# Patient Record
Sex: MALE | Race: BLACK OR AFRICAN AMERICAN | NOT HISPANIC OR LATINO | Employment: FULL TIME | URBAN - METROPOLITAN AREA
[De-identification: names, ages, dates, MRNs, and addresses within clinical notes are randomized per-mention and may not be internally consistent; named-entity substitution may affect disease eponyms.]

---

## 2023-09-16 ENCOUNTER — HOSPITAL ENCOUNTER (EMERGENCY)
Facility: HOSPITAL | Age: 28
Discharge: HOME | End: 2023-09-16
Attending: EMERGENCY MEDICINE | Admitting: EMERGENCY MEDICINE
Payer: COMMERCIAL

## 2023-09-16 ENCOUNTER — APPOINTMENT (OUTPATIENT)
Dept: CT IMAGING | Facility: HOSPITAL | Age: 28
End: 2023-09-16
Payer: OTHER MISCELLANEOUS

## 2023-09-16 VITALS
SYSTOLIC BLOOD PRESSURE: 158 MMHG | WEIGHT: 315 LBS | HEART RATE: 67 BPM | DIASTOLIC BLOOD PRESSURE: 99 MMHG | HEIGHT: 74 IN | BODY MASS INDEX: 40.43 KG/M2 | OXYGEN SATURATION: 100 % | TEMPERATURE: 97.9 F | RESPIRATION RATE: 16 BRPM

## 2023-09-16 DIAGNOSIS — S06.0X0A CONCUSSION WITHOUT LOSS OF CONSCIOUSNESS, INITIAL ENCOUNTER: Primary | ICD-10-CM

## 2023-09-16 PROCEDURE — 70450 CT HEAD/BRAIN W/O DYE: CPT

## 2023-09-16 PROCEDURE — 99284 EMERGENCY DEPT VISIT MOD MDM: CPT

## 2023-09-16 RX ORDER — METOCLOPRAMIDE 5 MG/1
10 TABLET ORAL 3 TIMES DAILY PRN
Qty: 25 TABLET | Refills: 0 | Status: SHIPPED | OUTPATIENT
Start: 2023-09-16
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
## 2023-09-16 NOTE — Clinical Note
[facility] FSED [facility]  [address]  [place] [address]    Royce Medley was seen and treated in our emergency department on 9/16/2023.  He may return to work on 09/18/2023.         Thank you for choosing [facility].    Charleen Reid MD

## 2023-09-16 NOTE — Clinical Note
[facility] FSED [facility]  [address]  [place] [address]    Royce Medley was seen and treated in our emergency department on 9/16/2023.  He may return to work on 09/18/2023.         Thank you for choosing [facility].    Charleen Reid MD

## 2023-09-16 NOTE — DISCHARGE INSTRUCTIONS
The CT head did not show anything concerning. You have a mild concussion which usually does not show up on CT. Take the Reglan for nausea, which you can take one tablet when a migraine starts as well and add Ibuprofen 600 mg as well to treat migraines. Follow up with your PCP or call the number on this document for them to help you find a PCP. Please return to the ER if you have any symptoms that concern you.  
Abdomen soft, non-tender, no guarding.
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
## 2024-03-22 ENCOUNTER — HOSPITAL ENCOUNTER (EMERGENCY)
Facility: HOSPITAL | Age: 29
Discharge: HOME | End: 2024-03-22
Attending: EMERGENCY MEDICINE
Payer: OTHER MISCELLANEOUS

## 2024-03-22 ENCOUNTER — APPOINTMENT (OUTPATIENT)
Dept: GENERAL RADIOLOGY | Facility: HOSPITAL | Age: 29
End: 2024-03-22
Payer: OTHER MISCELLANEOUS

## 2024-03-22 VITALS
TEMPERATURE: 97.7 F | WEIGHT: 315 LBS | SYSTOLIC BLOOD PRESSURE: 140 MMHG | HEART RATE: 100 BPM | BODY MASS INDEX: 40.43 KG/M2 | OXYGEN SATURATION: 99 % | DIASTOLIC BLOOD PRESSURE: 85 MMHG | RESPIRATION RATE: 18 BRPM | HEIGHT: 74 IN

## 2024-03-22 DIAGNOSIS — S63.657A SPRAIN OF METACARPOPHALANGEAL (MCP) JOINT OF LEFT LITTLE FINGER, INITIAL ENCOUNTER: Primary | ICD-10-CM

## 2024-03-22 PROCEDURE — 73130 X-RAY EXAM OF HAND: CPT

## 2024-03-22 PROCEDURE — 99283 EMERGENCY DEPT VISIT LOW MDM: CPT

## 2024-03-22 RX ORDER — NAPROXEN 500 MG/1
500 TABLET ORAL 2 TIMES DAILY PRN
Qty: 20 TABLET | Refills: 0 | Status: SHIPPED | OUTPATIENT
Start: 2024-03-22

## 2024-03-22 NOTE — Clinical Note
[facility] EMERGENCY DEPARTMENT  [address] [place] [address]  Phone: [phone]    Royce Medley was seen and treated in our emergency department on 3/22/2024.  He may return to work on 03/23/2024.         Thank you for choosing [facility].    Franck Carpenter MD
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
## 2024-04-26 PROCEDURE — 96375 TX/PRO/DX INJ NEW DRUG ADDON: CPT

## 2024-04-26 PROCEDURE — 96374 THER/PROPH/DIAG INJ IV PUSH: CPT

## 2024-04-26 PROCEDURE — 99284 EMERGENCY DEPT VISIT MOD MDM: CPT

## 2024-04-27 ENCOUNTER — HOSPITAL ENCOUNTER (EMERGENCY)
Facility: HOSPITAL | Age: 29
Discharge: HOME | End: 2024-04-27
Attending: EMERGENCY MEDICINE
Payer: COMMERCIAL

## 2024-04-27 ENCOUNTER — APPOINTMENT (OUTPATIENT)
Dept: CT IMAGING | Facility: HOSPITAL | Age: 29
End: 2024-04-27
Payer: COMMERCIAL

## 2024-04-27 VITALS
OXYGEN SATURATION: 95 % | SYSTOLIC BLOOD PRESSURE: 125 MMHG | BODY MASS INDEX: 40.43 KG/M2 | DIASTOLIC BLOOD PRESSURE: 93 MMHG | WEIGHT: 315 LBS | RESPIRATION RATE: 16 BRPM | TEMPERATURE: 96.9 F | HEIGHT: 74 IN | HEART RATE: 63 BPM

## 2024-04-27 DIAGNOSIS — S09.90XA CLOSED HEAD INJURY, INITIAL ENCOUNTER: Primary | ICD-10-CM

## 2024-04-27 PROCEDURE — 96374 THER/PROPH/DIAG INJ IV PUSH: CPT

## 2024-04-27 PROCEDURE — 25810000003 SODIUM CHLORIDE 0.9 % SOLUTION: Performed by: NURSE PRACTITIONER

## 2024-04-27 PROCEDURE — 25010000002 PROCHLORPERAZINE 10 MG/2ML SOLUTION: Performed by: NURSE PRACTITIONER

## 2024-04-27 PROCEDURE — 70450 CT HEAD/BRAIN W/O DYE: CPT

## 2024-04-27 PROCEDURE — 25010000002 DIPHENHYDRAMINE PER 50 MG: Performed by: NURSE PRACTITIONER

## 2024-04-27 PROCEDURE — 96375 TX/PRO/DX INJ NEW DRUG ADDON: CPT

## 2024-04-27 RX ORDER — ONDANSETRON 4 MG/1
4 TABLET, ORALLY DISINTEGRATING ORAL EVERY 8 HOURS PRN
Qty: 20 TABLET | Refills: 0 | Status: SHIPPED | OUTPATIENT
Start: 2024-04-27

## 2024-04-27 RX ORDER — DIPHENHYDRAMINE HYDROCHLORIDE 50 MG/ML
25 INJECTION INTRAMUSCULAR; INTRAVENOUS ONCE
Status: COMPLETED | OUTPATIENT
Start: 2024-04-27 | End: 2024-04-27

## 2024-04-27 RX ORDER — PROCHLORPERAZINE EDISYLATE 5 MG/ML
10 INJECTION INTRAMUSCULAR; INTRAVENOUS ONCE
Status: COMPLETED | OUTPATIENT
Start: 2024-04-27 | End: 2024-04-27

## 2024-04-27 RX ADMIN — SODIUM CHLORIDE 1000 ML: 9 INJECTION, SOLUTION INTRAVENOUS at 00:30

## 2024-04-27 RX ADMIN — DIPHENHYDRAMINE HYDROCHLORIDE 25 MG: 50 INJECTION, SOLUTION INTRAMUSCULAR; INTRAVENOUS at 00:31

## 2024-04-27 RX ADMIN — PROCHLORPERAZINE EDISYLATE 10 MG: 5 INJECTION INTRAMUSCULAR; INTRAVENOUS at 00:32

## 2024-04-27 NOTE — ED PROVIDER NOTES
"SHARED VISIT: This visit was performed by BOTH a physician and an APC. The substantive portion of the medical decision making was performed by this attesting physician who made or approved the management plan and takes responsibility for patient management. All studies in the APC note (if performed) were independently interpreted by me.     The EDDY and I have discussed this patient's history, physical exam, and treatment plan.  I have reviewed the documentation and personally had a face to face interaction with the patient. I affirm the documentation and agree with the treatment and plan.  The attached note describes my personal findings.      I provided a substantive portion of the care of the patient.  I personally performed the physical exam in its entirety, and below are my findings.     Brief history of present illness: 28-year-old male presents for headache with nausea and vomiting status post fall with head injury.  Patient reports mechanical fall as source of injury.    Physical exam:    /93   Pulse 74   Temp 96.9 °F (36.1 °C) (Tympanic)   Resp 16   Ht 188 cm (74\")   Wt (!) 150 kg (330 lb)   SpO2 96%   BMI 42.37 kg/m²       Physical Exam   Constitutional: No distress.  Nontoxic  HENT:  Head: Normocephalic and atraumatic.   Oropharynx: Mucous membranes are moist.   Eyes: .  Photophobia noted  Neck: Normal range of motion. Neck supple.   Cardiovascular: Pink warm and well perfused throughout.    Pulmonary/Chest: No respiratory distress.    Abdominal: Soft. There is no rebound or rigidity.  Benign  Musculoskeletal: Moves all extremities equally.  No deformity  Neurological: Alert and oriented.  Speech fluent and easily intelligible  Skin: Skin is pink, warm, and dry.   Psychiatric: Mood and affect normal.   Nursing note and vitals reviewed.        MDM:    My differential diagnosis includes but is not limited to cerebral contusion, cervical strain, concussion with LOC, concussion without LOC, " contusion, fracture of the skull, orbits or mandible, hematoma, intracranial hemorrhage including subdural, epidural, subarachnoid and intracerebral, laceration and postconcussion syndrome    RADIOLOGY      Study: Noncontrast CT head  Findings: No large volume intracranial hemorrhage or midline shift appreciated  I independently viewed and interpreted these images contemporaneously with treatment.       Please note that portions of this were completed with a voice recognition program.       Note Disclaimer: At [facility], we believe that sharing information builds trust and better relationships. You are receiving this note because you are receiving care at [facility] or recently visited. It is possible you will see health information before a provider has talked with you about it. This kind of information can be easy to misunderstand. To help you fully understand what it means for your health, we urge you to discuss this note with your provider.     Aly Ray MD  04/27/24 0133

## 2024-04-27 NOTE — DISCHARGE INSTRUCTIONS
You have been given emergency department evaluation.  This evaluation is intended to rule out life-threatening conditions.  Is not a complete evaluation.  You could require further testing as determined by your primary care physician or any referred specialist.  Please follow-up with all doctors that you are referred to.  Please be sure to take your prescribed medications and follow any specific instructions in the discharge instructions.  Please follow-up with your primary care physician within 48 hours.  Please have your primary care provider recheck your blood pressure.  Rest.  Drink plenty of fluids, stay hydrated.  Please follow-up with a neuropsychologist for further evaluation and management of symptoms.  Please return to the emergency department if you experience chest pain, shortness of breath, abdominal pain, fever greater than 102, intractable vomiting.  Please return to the emergency department if your symptoms continue or worsen, or if you begin to experience any other concerning symptom.

## 2024-04-27 NOTE — ED PROVIDER NOTES
EMERGENCY DEPARTMENT ENCOUNTER  Room Number:  05/05  PCP: Placido Hearn MD  Independent Historians: Patient  Time seen by me: 0014      HPI:  Chief Complaint: had concerns including Fall and Head Injury.     A complete HPI/ROS/PMH/PSH/SH/FH are unobtainable due to: None    Chronic or social conditions impacting patient care (Social Determinants of Health): None      Context: Royce Medley III is a 28 y.o. male who presents to the emergency department with complaints of head injury.  Patient states prior to arrival he tripped on something on his back porch causing him to fall backwards.  He states he hit his head during the fall.  He is not currently taking any anticoagulants.  No LOC.  Patient admits to nausea and photophobia.  Patient denies other injuries, other arthralgias, fever, chills, lightheadedness, dizziness, numbness, tingling, unilateral extremity weakness, syncope, shortness of breath, chest pain, abdominal pain, nausea, vomiting, diarrhea, dysuria, hematuria, or other complaints.    Review of prior external notes (non-ED) -and- Review of prior external test results outside of this encounter:   January 31, 2024: Urgent care office visit.  Patient seen for complaints of congestion.  Diagnosed with viral pharyngitis.    PAST MEDICAL HISTORY  Active Ambulatory Problems     Diagnosis Date Noted    No Active Ambulatory Problems     Resolved Ambulatory Problems     Diagnosis Date Noted    No Resolved Ambulatory Problems     Past Medical History:   Diagnosis Date    ADHD     Depression     Migraine          PAST SURGICAL HISTORY  Past Surgical History:   Procedure Laterality Date    KNEE ARTHROSCOPY W/ OSTEOCHONDRITIS DISSECANS LESION DRILLING Right     SHOULDER ARTHROSCOPY W/ LABRAL REPAIR Left          FAMILY HISTORY  No family history on file.      SOCIAL HISTORY  Social History     Socioeconomic History    Marital status: Single   Tobacco Use    Smoking status: Never   Substance and Sexual  Activity    Alcohol use: Never    Drug use: Never         ALLERGIES  Patient has no known allergies.      REVIEW OF SYSTEMS  Included in HPI  All systems reviewed and negative except for those discussed in HPI.      PHYSICAL EXAM    I have reviewed the triage vital signs and nursing notes.    ED Triage Vitals   Temp Heart Rate Resp BP SpO2   04/26/24 2341 04/26/24 2341 04/26/24 2341 04/26/24 2345 04/26/24 2341   96.9 °F (36.1 °C) 96 14 139/95 100 %      Temp src Heart Rate Source Patient Position BP Location FiO2 (%)   04/26/24 2341 04/26/24 2341 -- -- --   Tympanic Monitor          GENERAL: not distressed  HENT: nares patent  Head/neck/ face are symmetric without gross deformity or swelling  Photophobic, eyes are covered  EYES: no scleral icterus  CV: regular rhythm, regular rate with intact distal pulses  RESPIRATORY: normal effort and no respiratory distress  ABDOMEN: soft and non-tender  MUSCULOSKELETAL: no deformity  NEURO: alert and appropriate, moves all extremities, follows commands, speech is clear and fluent, no facial droop  SKIN: warm, dry    Vital signs and nursing notes reviewed.      LAB RESULTS  No results found for this or any previous visit (from the past 24 hour(s)).      RADIOLOGY  CT Head Without Contrast    Result Date: 4/27/2024  CT OF THE HEAD WITHOUT CONTRAST  HISTORY: Fall  COMPARISON: September 16, 2023  TECHNIQUE: Axial CT imaging was obtained through the brain. No IV contrast was administered.  FINDINGS: No acute intracranial hemorrhage is seen. Ventricles are normal in size. There is no midline shift or mass effect. No calvarial fracture is seen. Mucosal thickening is noted within the ethmoid sinuses, as well as the right maxillary sinus. Mucous retention cyst is the left sphenoid sinus. There are trace bilateral mastoid effusions.      No acute intracranial findings.  Radiation dose reduction techniques were utilized, including automated exposure control and exposure modulation based  on body size.   This report was finalized on 4/27/2024 1:28 AM by Dr. Erica Salazar M.D on Workstation: [id number]         MEDICATIONS GIVEN IN ER  Medications   sodium chloride 0.9 % bolus 1,000 mL (0 mL Intravenous Stopped 4/27/24 0056)   prochlorperazine (COMPAZINE) injection 10 mg (10 mg Intravenous Given 4/27/24 0032)   diphenhydrAMINE (BENADRYL) injection 25 mg (25 mg Intravenous Given 4/27/24 0031)           OUTPATIENT MEDICATION MANAGEMENT:  No current Epic-ordered facility-administered medications on file.     Current Outpatient Medications Ordered in Epic   Medication Sig Dispense Refill    metoclopramide (REGLAN) 5 MG tablet Take 2 tablets by mouth 3 (Three) Times a Day As Needed (Take one tablet when you have an onset of migraine symptoms along with Ibuprofen 600 mg by mouth) for up to 25 doses. 25 tablet 0    naproxen (EC NAPROSYN) 500 MG EC tablet Take 1 tablet by mouth 2 (Two) Times a Day As Needed for Mild Pain. 20 tablet 0    ondansetron ODT (ZOFRAN-ODT) 4 MG disintegrating tablet Place 1 tablet on the tongue Every 8 (Eight) Hours As Needed for Nausea or Vomiting. 20 tablet 0         PROCEDURES  Procedures        PROGRESS, DATA ANALYSIS, CONSULTS, AND MEDICAL DECISION MAKING  ORDERS PLACED DURING THIS VISIT:  No orders of the defined types were placed in this encounter.      All labs have been independently interpreted by me.  All radiology studies have been reviewed by me. All EKG's have been independently viewed by me.  Discussion below represents my analysis of pertinent findings related to patient's condition, differential diagnosis, treatment plan and final disposition.    Differential diagnosis includes but is not limited to:   Intraparenchymal hemorrhage, subdural hemorrhage, subarachnoid hemorrhage, concussion, etc.    ED Course/Progress Notes/MDM:  ED Course as of 04/27/24 0531   Sat Apr 27, 2024   0027 I discussed the case with Dr. Ray and they agree to evaluate the patient at the  bedside.    [AR]   0308 The patient was reexamined.  They have had symptomatic improvement during their ED stay.  I discussed today's findings with the patient, explaining the pertinent positives and negatives from today's visit, and the plan of care.  Discussed plan for discharge as there is no emergent indication for admission.  Discussed limitation of the ED work-up and that this is to rule out life-threatening emergencies but that they could require further testing as determined by their primary care and or any referred specialist patient is agreeable and understands need for follow-up and repeat exam/testing.  Patient is aware that discharge does not mean there is nothing wrong, indicates no emergency is present, and that they must continue their care with their primary care physician and/or any referred specialist.  They were given appropriate follow-up with their primary care physician and/or specialist.  I had an extensive discussion on the expected clinical course and return precautions.  Patient understands to return to the emergency department for continuation, worsening, or new symptoms.  I answered any of the patient's questions. Patient was discharged home in a stable condition.     [AR]      ED Course User Index  [AR] Daja Mayer APRN       MDM:  Patient is a 28-year-old male who presents to the emergency department with complaints of head injury secondary to a fall.  Fall was mechanical.  CT head unremarkable.  Vital signs stable.  Headache treated while in ED.  Patient had pain relief while in ED.  Patient will be discharged home, he is agreeable.  Advise follow-up with PCP and neuropsychologist.  Strict return precautions given.      COMPLEXITY OF CARE  Admission was considered but after careful review of the patient's presentation, physical examination, diagnostic results, and response to treatment the patient may be safely discharged with outpatient follow-up.        DIAGNOSIS  Final  diagnoses:   Closed head injury, initial encounter         DISPOSITION  ED Disposition       ED Disposition   Discharge    Condition   Stable    Comment   --                    Please note that portions of this document were completed with a voice recognition program.    Note Disclaimer: At [facility], we believe that sharing information builds trust and better relationships. You are receiving this note because you recently visited [facility]. It is possible you will see health information before a provider has talked with you about it. This kind of information can be easy to misunderstand. To help you fully understand what it means for your health, we urge you to discuss this note with your provider.     Daja Mayer, SANJANA  04/27/24 0532

## 2024-04-27 NOTE — Clinical Note
[facility] EMERGENCY DEPARTMENT  [address] [facility] [address]  Phone: [phone]    Frida Leif accompanied Royce Medley to the emergency department on 4/26/2024. They may return to work on 04/27/2024.        Thank you for choosing [facility].    Jody Flowers, RN

## 2024-04-27 NOTE — ED TRIAGE NOTES
Pt to Ed from , had a fall at home and hit his head on concrete, denies losing consciousness but did have an anxiety attack afterwards. No open contusion noted at triage. N/v present